# Patient Record
(demographics unavailable — no encounter records)

---

## 2024-11-07 NOTE — PHYSICAL EXAM
[Alert] : alert [Normal Voice/Communication] : normal voice/communication [Healthy Appearing] : healthy appearing [No Acute Distress] : no acute distress [Sclera] : the sclera and conjunctiva were normal [Hearing Threshold Finger Rub Not [place]] : hearing was normal [Normal Lips/Gums] : the lips and gums were normal [Oropharynx] : the oropharynx was normal [Normal Appearance] : the appearance of the neck was normal [No Neck Mass] : no neck mass was observed [No Respiratory Distress] : no respiratory distress [No Acc Muscle Use] : no accessory muscle use [Respiration, Rhythm And Depth] : normal respiratory rhythm and effort [Auscultation Breath Sounds / Voice Sounds] : lungs were clear to auscultation bilaterally [Heart Rate And Rhythm] : heart rate was normal and rhythm regular [Normal S1, S2] : normal S1 and S2 [Murmurs] : no murmurs [Bowel Sounds] : normal bowel sounds [Abdomen Tenderness] : non-tender [No Masses] : no abdominal mass palpated [Abdomen Soft] : soft [] : no hepatosplenomegaly [Oriented To Time, Place, And Person] : oriented to person, place, and time

## 2024-11-07 NOTE — HISTORY OF PRESENT ILLNESS
[FreeTextEntry1] : Thank you for consult.  Patient is a 70 y/o female with a PMHx of hypertension, Hashimoto's thyroiditis, cardiac murmur, hypercholesterolemia, hyperlipidemia, Lt Ventricular hypertrophy, obesity, osteoarthritis, osteopenia, prediabetes, Shingles Polyneuropathy, Vitamin D deficiency, GERD, Gastritis, past H. Pylori Infection (treated), colon diverticulosis, colon polyps, and Grade Internal Hemorrhoids, who presents for screening colonoscopy. Pt is currently asymptomatic and feeling well. Pt denies abdominal pain, rectal bleeding. Pt states that she was born in [facility]. Last colonoscopy was 10/5/2021, which showed colon diverticulosis, a normal recto-sigmoid tattoo site, and Grade I Internal hemorrhoids, prep was fair. Pt was instructed to f/u in 3 years for next colonoscopy.

## 2024-11-07 NOTE — ASSESSMENT
[FreeTextEntry1] : Referred pt for screening colonoscopy.  A low acid/reflux diet was discussed in great detail including not smoking, not drinking alcohol, and not consuming foods that irritate the esophagus. It is helpful to eat small meals throughout the day instead of large meals. You should avoid eating before bedtime or lying down after you eat. It can be helpful to raise the head of your bed six inches. Additionally, you should maintain a healthy weight and good posture. The patient was given written material to take home and review.  Diverticulosis occurs when small, bulging sacs or pouches form on the inner wall of the intestine. These sacs are called diverticula. Most often, these pouches form in the large intestine (colon). They may also occur in the jejunum in the small intestine, although this is less common.   Causes  Diverticulosis is less common in people age 40 and younger. It's more common in older adults. About half of Americans over age 60 have this condition. Most people will have it by age 80.    No one knows exactly what causes these pouches to form.    For many years, it was thought that eating a low-fiber diet may play a role. Not eating enough fiber can cause constipation (hard stools). Straining to pass stools (feces) increases the pressure in the colon or intestines. This may cause the pouches to form at weak spots in the colon wall. However, whether a low-fiber diet leads to this problem is not well proven.    Other possible risk factors that are also not well proven are lack of exercise and obesity.    Eating nuts, popcorn, or corn does not appear to lead to inflammation of these pouches (diverticulitis).    Symptoms  Most people with diverticulosis have no symptoms.    When symptoms occur, they may include:    Pain and cramps in your stomach especially in the left lower abdomen  Constipation (sometimes diarrhea)  Bloating or gas  Not feeling hungry and not eating  You may notice small amounts of blood in your stools or on toilet paper. Rarely, more severe bleeding may occur.    Exams and Tests  Diverticulosis is often found during an exam for another health problem. For example, it is often discovered during a colonoscopy.    If you do have symptoms, you may have one or more of the following tests:    Blood tests to see if you have an infection or have lost too much blood  CT scan or ultrasound of the abdomen if you have bleeding, loose stools, or pain  A colonoscopy is needed to make the diagnosis:    A colonoscopy is an exam that views the inside of the colon and rectum. This test should not be done when you are having symptoms of acute diverticulitis.  A small camera attached to a tube can reach the length of the colon.  Angiography:    Angiography is an imaging test that uses x-rays and a special dye to see inside the blood vessels.  This test may be used if the area of bleeding is not seen during a colonoscopy.  Treatment  Because most people have no symptoms, most of the time, no treatment is needed.    Your health care provider may recommend getting more fiber in your diet. A high-fiber diet has many health benefits. Most people don't get enough fiber. To help prevent constipation, you should:    Eat plenty of whole grains, beans, fruits, and vegetables. Limit processed foods.  Drink plenty of fluids.  Get regular exercise.  Talk with your provider about taking a fiber supplement.  You should avoid NSAIDs such as aspirin, ibuprofen (Motrin), and naproxen (Aleve). These medicines can make bleeding more likely.    For bleeding that does not stop or recurs:    Colonoscopy may be used to inject medicines or burn a certain area in the intestine to stop the bleeding.  Angiography may be used to infuse medicines or block off a blood vessel.  If bleeding does not stop or recurs many times, removal of a section of the colon may be needed.    [place] (Prognosis)  Most people who have diverticulosis have no symptoms. Once these pouches have formed, you will have them for life.    Up to 25% of people with the condition will develop diverticulitis. This occurs when small pieces of stool become trapped in the pouches, causing infection or swelling.    Possible Complications  More serious problems that may develop include:    Abnormal connections that form between parts of the colon or between the colon and another part of the body (fistula)  Hole or tear in the colon (perforation)  Narrowed area in the colon (stricture)  Pockets filled with pus or infection (abscess)  When to Contact a Medical Professional  Call your provider if symptoms of diverticulitis occur.    A colonoscopy is an exam of the lower part of the gastrointestinal tract, which is called the colon or large intestine (bowel). Colonoscopy is a safe procedure that provides information that other tests may not be able to give.  Colonoscopy is performed by inserting a device called a colonoscope into the anus and advancing through the entire colon. The procedure generally takes between 20 minutes and one hour.  Other tests that are sometimes used to screen for colon cancer, like virtual colonoscopy (also called CT colonography), were discussed separately.  REASONS FOR COLONOSCOPY:  The most common reasons for colonoscopy are:  1. To screen for colon polyps (growths of tissue in the colon) or colon cancer  2. Rectal bleeding  3. A change in bowel habits, like persistent diarrhea  4. Iron deficiency anemia (a decrease in blood count due to loss of iron)  5. A family history of colon cancer  6. A personal history of colon polyps or colon cancer  7. Chronic, unexplained abdominal or rectal pain  8. An abnormal X-Ray exam, like a barium enema or CT scan.   COLONOSCOPY PREPARATION: Before colonoscopy, your colon must be completely cleaned out so that the doctor can see any abnormal areas. This is vitally important to increase the chances that your doctor will identify abnormalities in your colon. If your colon is not completely cleaned out, the chances your doctor will miss abnormalities increases. Your doctor's office will provide specific instructions about how you should prepare for your colonoscopy. Be sure to read these instructions as soon as you get them so you will know how to take the preparation and whether you need to make any changes to your medications or diet. If you have questions, call the doctor's office in advance.  I spent 45 minutes with the patient as well as reviewing documents prior to and after the office visit. Patient verbalized understanding of all information provided. All questions answered and reviewed.  Robert Brunner, MD